# Patient Record
Sex: MALE | Race: WHITE | URBAN - METROPOLITAN AREA
[De-identification: names, ages, dates, MRNs, and addresses within clinical notes are randomized per-mention and may not be internally consistent; named-entity substitution may affect disease eponyms.]

---

## 2017-01-26 ENCOUNTER — OFFICE VISIT (OUTPATIENT)
Dept: FAMILY MEDICINE | Facility: CLINIC | Age: 42
End: 2017-01-26
Payer: COMMERCIAL

## 2017-01-26 VITALS
OXYGEN SATURATION: 99 % | HEIGHT: 72 IN | DIASTOLIC BLOOD PRESSURE: 79 MMHG | SYSTOLIC BLOOD PRESSURE: 114 MMHG | HEART RATE: 79 BPM | WEIGHT: 203 LBS | RESPIRATION RATE: 18 BRPM | BODY MASS INDEX: 27.5 KG/M2 | TEMPERATURE: 98 F

## 2017-01-26 DIAGNOSIS — R21 RASH: Primary | ICD-10-CM

## 2017-01-26 DIAGNOSIS — Z00.00 ROUTINE GENERAL MEDICAL EXAMINATION AT A HEALTH CARE FACILITY: ICD-10-CM

## 2017-01-26 DIAGNOSIS — Z23 NEED FOR PROPHYLACTIC VACCINATION WITH TETANUS-DIPHTHERIA (TD): ICD-10-CM

## 2017-01-26 LAB
ERYTHROCYTE [DISTWIDTH] IN BLOOD BY AUTOMATED COUNT: 12.9 % (ref 10–15)
HCT VFR BLD AUTO: 46.1 % (ref 40–53)
HGB BLD-MCNC: 15.7 G/DL (ref 13.3–17.7)
MCH RBC QN AUTO: 30.5 PG (ref 26.5–33)
MCHC RBC AUTO-ENTMCNC: 34.1 G/DL (ref 31.5–36.5)
MCV RBC AUTO: 90 FL (ref 78–100)
PLATELET # BLD AUTO: 323 10E9/L (ref 150–450)
RBC # BLD AUTO: 5.15 10E12/L (ref 4.4–5.9)
WBC # BLD AUTO: 7.6 10E9/L (ref 4–11)

## 2017-01-26 PROCEDURE — 99396 PREV VISIT EST AGE 40-64: CPT | Mod: 25 | Performed by: INTERNAL MEDICINE

## 2017-01-26 PROCEDURE — 90471 IMMUNIZATION ADMIN: CPT | Performed by: INTERNAL MEDICINE

## 2017-01-26 PROCEDURE — 80061 LIPID PANEL: CPT | Performed by: INTERNAL MEDICINE

## 2017-01-26 PROCEDURE — 80053 COMPREHEN METABOLIC PANEL: CPT | Performed by: INTERNAL MEDICINE

## 2017-01-26 PROCEDURE — 36415 COLL VENOUS BLD VENIPUNCTURE: CPT | Performed by: INTERNAL MEDICINE

## 2017-01-26 PROCEDURE — 85027 COMPLETE CBC AUTOMATED: CPT | Performed by: INTERNAL MEDICINE

## 2017-01-26 PROCEDURE — 90714 TD VACC NO PRESV 7 YRS+ IM: CPT | Performed by: INTERNAL MEDICINE

## 2017-01-26 NOTE — Clinical Note
"[facility]  [address] [facility]  [address]  [place]  [address]  Tel: [phone]    January 30, 2017    Mani Tillman  [facility] [place] [address]        Dear Mr. Tillman,    -Liver and gallbladder tests are normal for you. (ALT,AST, Alk phos, bilirubin), kidney function is normal for you (Creatinine, GFR), Sodium is normal, Potassium is normal for you, Calcium is normal for you.    -Your non fasting Glucose (blood sugar) is elevated, but not quite in the diabetic range.    -Your total cholesterol is 210 which is above your goal of total cholesterol less than 200.    -Your triglycerides are 173 which are just above your goal of triglycerides less than 150.    -Your HDL or \"good cholesterol\" is 34 which is below your goal of HDL cholesterol greater than 40.    -Your LDL cholesterol or \"bad cholesterol\" is  which is just above your goal of LDL cholesterol less than <160.  Your LDL goal is based on your risk factors for artery disease.    -Your complete blood counts including your hemoglobin returned normal for you.         Bottom line:  Your triglycerides and sugar are a little high and this is because you were not fasting.  You do NOT need medications to address these problems.  If you are curious, you can return for fasting labs to see if these values improve.  Otherwise, they could be rechecked in one years' time when you return for a physical.    Follow up:  Lab appointment fasting at your convenience or schedule an appointment for a physical examination with fasting blood tests in one year's time, or return sooner if new questions, symptoms or problems arise.       Sincerely,    Dr. Nunez    Results for orders placed or performed in visit on 01/26/17   Comprehensive metabolic panel   Result Value Ref Range    Sodium 140 133 - 144 mmol/L    Potassium 4.0 3.4 - 5.3 mmol/L    Chloride 104 94 - 109 mmol/L    Carbon Dioxide 29 20 - 32 mmol/L    Anion Gap 7 3 - 14 mmol/L "    Glucose 125 (H) 70 - 99 mg/dL    Urea Nitrogen 14 7 - 30 mg/dL    Creatinine 0.84 0.66 - 1.25 mg/dL    GFR Estimate >90  Non  GFR Calc   >60 mL/min/1.7m2    GFR Estimate If Black >90   GFR Calc   >60 mL/min/1.7m2    Calcium 9.0 8.5 - 10.1 mg/dL    Bilirubin Total 0.6 0.2 - 1.3 mg/dL    Albumin 4.2 3.4 - 5.0 g/dL    Protein Total 7.7 6.8 - 8.8 g/dL    Alkaline Phosphatase 94 40 - 150 U/L    ALT 64 0 - 70 U/L    AST 24 0 - 45 U/L   Lipid Profile with reflex to direct LDL   Result Value Ref Range    Cholesterol 210 (H) <200 mg/dL    Triglycerides 173 (H) <150 mg/dL    HDL Cholesterol 34 (L) >39 mg/dL    LDL Cholesterol Calculated 141 (H) <100 mg/dL    Non HDL Cholesterol 176 (H) <130 mg/dL   CBC with platelets   Result Value Ref Range    WBC 7.6 4.0 - 11.0 10e9/L    RBC Count 5.15 4.4 - 5.9 10e12/L    Hemoglobin 15.7 13.3 - 17.7 g/dL    Hematocrit 46.1 40.0 - 53.0 %    MCV 90 78 - 100 fl    MCH 30.5 26.5 - 33.0 pg    MCHC 34.1 31.5 - 36.5 g/dL    RDW 12.9 10.0 - 15.0 %    Platelet Count 323 150 - 450 10e9/L

## 2017-01-26 NOTE — PROGRESS NOTES
SUBJECTIVE:     CC: Mani Tillman is an 41 year old male who presents for preventative health visit.     Healthy Habits:    Do you get at least three servings of calcium containing foods daily (dairy, green leafy vegetables, etc.)? yes    Amount of exercise or daily activities, outside of work: light walking    Problems taking medications regularly No    Medication side effects: No    Have you had an eye exam in the past two years? yes    Do you see a dentist twice per year? no    Do you have sleep apnea, excessive snoring or daytime drowsiness?no            Today's PHQ-2 Score:   PHQ-2 ( 1999 Pf[employer]) 1/26/2017 11/21/2014   Q1: Little interest or pleasure in doing things 0 0   Q2: Feeling down, depressed or hopeless 0 0   PHQ-2 Score 0 0       Abuse: Current or Past(Physical, Sexual or Emotional)- No  Do you feel safe in your environment - Yes    Social History   Substance Use Topics     Smoking status: Never Smoker      Smokeless tobacco: Never Used     Alcohol Use: Yes      Comment: 1 drink per 2 weeks     The patient does not drink >3 drinks per day nor >7 drinks per week.    Last PSA: No results found for: PSA    Recent Labs   Lab Test  11/21/14   1013  04/26/13   1013   CHOL  209*  190   HDL  36*  35*   LDL  147*  136*   TRIG  132  89   CHOLHDLRATIO  5.8*  5.3*       Reviewed orders with patient. Reviewed health maintenance and updated orders accordingly -     All Histories reviewed and updated in Epic.      ROS:  C: NEGATIVE for fever, chills, change in weight  I: several year long history of red rash on back, neck, chest not itchy  E: NEGATIVE for vision changes or irritation  ENT: NEGATIVE for ear, mouth and throat problems  R: NEGATIVE for significant cough or SOB  CV: NEGATIVE for chest pain, palpitations or peripheral edema  GI: NEGATIVE for nausea, abdominal pain, heartburn, or change in bowel habits   male: negative for dysuria, hematuria, decreased urinary stream, erectile dysfunction, urethral  discharge  M: NEGATIVE for significant arthralgias or myalgia  N: NEGATIVE for weakness, dizziness or paresthesias  P: NEGATIVE for changes in mood or affect    Patient Active Problem List   Diagnosis     Hyperlipidemia LDL goal <160     Palpitations     Past Surgical History   Procedure Laterality Date     C revise thumb tendon  1996     Hc tooth extraction w/forcep  2010       Social History   Substance Use Topics     Smoking status: Never Smoker      Smokeless tobacco: Never Used     Alcohol Use: Yes      Comment: 1 drink per 2 weeks     Family History   Problem Relation Age of Onset     Cardiovascular Maternal Grandfather      C.A.D. Maternal Grandfather      CEREBROVASCULAR DISEASE Paternal Grandfather      Lipids No family hx of      Asthma No family hx of      DIABETES No family hx of      Cancer - colorectal No family hx of      Prostate Cancer No family hx of      Anesthesia Reaction No family hx of      Blood Disease No family hx of      Eye Disorder No family hx of      Thyroid Disease No family hx of      Family History Negative Mother      Arrhythmia Father          No current outpatient prescriptions on file.     No Known Allergies  OBJECTIVE:     /79 mmHg  Pulse 79  Temp(Src) 98  F (36.7  C) (Oral)  Resp 18  Ht 6' (1.829 m)  Wt 203 lb (92.08 kg)  BMI 27.53 kg/m2  SpO2 99%  EXAM:  GENERAL: healthy, alert and no distress  EYES: Eyes grossly normal to inspection, PERRL and conjunctivae and sclerae normal  HENT: ear canals and TM's normal, nose and mouth without ulcers or lesions  NECK: no adenopathy, no asymmetry, masses, or scars and thyroid normal to palpation  RESP: lungs clear to auscultation - no rales, rhonchi or wheezes  CV: regular rate and rhythm, normal S1 S2, no S3 or S4, no murmur, click or rub, no peripheral edema and peripheral pulses strong  ABDOMEN: soft, nontender, no hepatosplenomegaly, no masses and bowel sounds normal  RECTAL: normal sphincter tone, no rectal masses,  prostate normal size, smooth, nontender without nodules or masses  MS: no gross musculoskeletal defects noted, no edema  SKIN: diffuse erythematous macular/papular rash on chest , neck and back  NEURO: Normal strength and tone, mentation intact and speech normal  PSYCH: mentation appears normal, affect normal/bright    ASSESSMENT/PLAN:     1. Routine general medical examination at a health care facility    - Comprehensive metabolic panel  - Lipid Profile with reflex to direct LDL  - CBC with platelets    2. Need for prophylactic vaccination with tetanus-diphtheria (TD)  Td today    See Dr. Ramon about chronic rash    COUNSELING:  Reviewed preventive health counseling, as reflected in patient instructions       Regular exercise       Healthy diet/nutrition         reports that he has never smoked. He has never used smokeless tobacco.    Estimated body mass index is 27.53 kg/(m^2) as calculated from the following:    Height as of this encounter: 6' (1.829 m).    Weight as of this encounter: 203 lb (92.08 kg).   Weight management plan: Discussed healthy diet and exercise guidelines and patient will follow up in 12 months in clinic to re-evaluate.    Counseling Resources:  ATP IV Guidelines  Pooled Cohorts Equation Calculator  FRAX Risk Assessment  ICSI Preventive Guidelines  Dietary Guidelines for Americans, 2010  USDA's MyPlate  ASA Prophylaxis  Lung CA Screening    Jonathan Nunez MD  [facility]

## 2017-01-26 NOTE — NURSING NOTE
Screening Questionnaire for Adult Immunization    Are you sick today?   No   Do you have allergies to medications, food, a vaccine component or latex?   No   Have you ever had a serious reaction after receiving a vaccination?   No   Do you have a long-term health problem with heart disease, lung disease, asthma, kidney disease, metabolic disease (e.g. diabetes), anemia, or other blood disorder?   No   Do you have cancer, leukemia, HIV/AIDS, or any other immune system problem?   No   In the past 3 months, have you taken medications that affect  your immune system, such as prednisone, other steroids, or anticancer drugs; drugs for the treatment of rheumatoid arthritis, Crohn s disease, or psoriasis; or have you had radiation treatments?   No   Have you had a seizure, or a brain or other nervous system problem?   No   During the past year, have you received a transfusion of blood or blood     products, or been given immune (gamma) globulin or antiviral drug?   No   For women: Are you pregnant or is there a chance you could become        pregnant during the next month?   No   Have you received any vaccinations in the past 4 weeks?   No     Immunization questionnaire answers were all negative.      MNVFC doesn't apply on this patient    Per orders of Dr. Nunez, injection of TD given by Hudson Funes. Patient instructed to remain in clinic for 20 minutes afterwards, and to report any adverse reaction to me immediately.       Screening performed by Hudson Funes on 1/26/2017 at 5:58 PM.

## 2017-01-26 NOTE — MR AVS SNAPSHOT
After Visit Summary   1/26/2017    Mani Tillman    MRN: [id number]           Patient Information     Date Of Birth          1975        Visit Information        Provider Department      1/26/2017 4:00 PM Jonathan Nunez MD [facility]        Today's Diagnoses     Rash    -  1     Routine general medical examination at a health care facility         Need for prophylactic vaccination with tetanus-diphtheria (TD)           Care Instructions      Preventive Health Recommendations  Male Ages 40 to 49    Yearly exam:             See your health care provider every year in order to  o   Review health changes.   o   Discuss preventive care.    o   Review your medicines if your doctor has prescribed any.    You should be tested each year for STDs (sexually transmitted diseases) if you re at risk.     Have a cholesterol test every 5 years.     Have a colonoscopy (test for colon cancer) if someone in your family has had colon cancer or polyps before age 50.     After age 45, have a diabetes test (fasting glucose). If you are at risk for diabetes, you should have this test every 3 years.      Talk with your health care provider about whether or not a prostate cancer screening test (PSA) is right for you.    Shots: Get a flu shot each year. Get a tetanus shot every 10 years.     Nutrition:    Eat at least 5 servings of fruits and vegetables daily.     Eat whole-grain bread, whole-wheat pasta and brown rice instead of white grains and rice.     Talk to your provider about Calcium and Vitamin D.     Lifestyle    Exercise for at least 150 minutes a week (30 minutes a day, 5 days a week). This will help you control your weight and prevent disease.     Limit alcohol to one drink per day.     No smoking.     Wear sunscreen to prevent skin cancer.     See your dentist every six months for an exam and cleaning.            Follow-ups after your visit        Additional Services     DERMATOLOGY REFERRAL        Your provider has referred you to: [facility]: [facility]  Stephanie [phone]   [email or web address]    Dr. Chrissy Ramon    Please be aware that coverage of these services is subject to the terms and limitations of your health insurance plan.  Call member services at your health plan with any benefit or coverage questions.      Please bring the following with you to your appointment:    (1) Any X-Rays, CTs or MRIs which have been performed.  Contact the facility where they were done to arrange for  prior to your scheduled appointment.    (2) List of current medications  (3) This referral request   (4) Any documents/labs given to you for this referral                  Who to contact     If you have questions or need follow up information about today's clinic visit or your schedule please contact [facility] directly at [phone].  Normal or non-critical lab and imaging results will be communicated to you by MyChart, letter or phone within 4 business days after the clinic has received the results. If you do not hear from us within 7 days, please contact the clinic through MyChart or phone. If you have a critical or abnormal lab result, we will notify you by phone as soon as possible.  Submit refill requests through [employer] or call your pharmacy and they will forward the refill request to us. Please allow 3 business days for your refill to be completed.          Additional Information About Your Visit        MyChart Information     [employer] gives you secure access to your electronic health record. If you see a primary care provider, you can also send messages to your care team and make appointments. If you have questions, please call your primary care clinic.  If you do not have a primary care provider, please call [phone] and they will assist you.        Care EveryWhere ID     This is your Care EveryWhere ID. This could be used by other organizations to access your [facility]  medical records  [id number]        Your Vitals Were     Pulse Temperature Respirations Height BMI (Body Mass Index) Pulse Oximetry    79 98  F (36.7  C) (Oral) 18 6' (1.829 m) 27.53 kg/m2 99%       Blood Pressure from Last 3 Encounters:   01/26/17 114/79   11/21/14 116/72   04/26/13 113/73    Weight from Last 3 Encounters:   01/26/17 203 lb (92.08 kg)   11/21/14 192 lb 12.8 oz (87.454 kg)   04/26/13 186 lb (84.369 kg)              We Performed the Following     CBC with platelets     Comprehensive metabolic panel     DERMATOLOGY REFERRAL     Lipid Profile with reflex to direct LDL          Today's Medication Changes          These changes are accurate as of: 1/26/17  4:39 PM.  If you have any questions, ask your nurse or doctor.               Stop taking these medicines if you haven't already. Please contact your care team if you have questions.     MULTIVITAMINS PO   Stopped by:  Jonathan Nunez MD                    Primary Care Provider Office Phone # Fax #    Jonathan Nunez [phone]       [facility] [address] [facility] [address]        Thank you!     Thank you for choosing [facility]  for your care. Our goal is always to provide you with excellent care. Hearing back from our patients is one way we can continue to improve our services. Please take a few minutes to complete the written survey that you may receive in the mail after your visit with us. Thank you!             Your Updated Medication List - Protect others around you: Learn how to safely use, store and throw away your medicines at www.disposemymeds.org.      Notice  As of 1/26/2017  4:39 PM    You have not been prescribed any medications.

## 2017-01-27 LAB
ALBUMIN SERPL-MCNC: 4.2 G/DL (ref 3.4–5)
ALP SERPL-CCNC: 94 U/L (ref 40–150)
ALT SERPL W P-5'-P-CCNC: 64 U/L (ref 0–70)
ANION GAP SERPL CALCULATED.3IONS-SCNC: 7 MMOL/L (ref 3–14)
AST SERPL W P-5'-P-CCNC: 24 U/L (ref 0–45)
BILIRUB SERPL-MCNC: 0.6 MG/DL (ref 0.2–1.3)
BUN SERPL-MCNC: 14 MG/DL (ref 7–30)
CALCIUM SERPL-MCNC: 9 MG/DL (ref 8.5–10.1)
CHLORIDE SERPL-SCNC: 104 MMOL/L (ref 94–109)
CHOLEST SERPL-MCNC: 210 MG/DL
CO2 SERPL-SCNC: 29 MMOL/L (ref 20–32)
CREAT SERPL-MCNC: 0.84 MG/DL (ref 0.66–1.25)
GFR SERPL CREATININE-BSD FRML MDRD: ABNORMAL ML/MIN/1.7M2
GLUCOSE SERPL-MCNC: 125 MG/DL (ref 70–99)
HDLC SERPL-MCNC: 34 MG/DL
LDLC SERPL CALC-MCNC: 141 MG/DL
NONHDLC SERPL-MCNC: 176 MG/DL
POTASSIUM SERPL-SCNC: 4 MMOL/L (ref 3.4–5.3)
PROT SERPL-MCNC: 7.7 G/DL (ref 6.8–8.8)
SODIUM SERPL-SCNC: 140 MMOL/L (ref 133–144)
TRIGL SERPL-MCNC: 173 MG/DL

## 2017-01-28 NOTE — PROGRESS NOTES
"Quick Note:    The following letter pertains to your most recent diagnostic tests:    -Liver and gallbladder tests are normal for you. (ALT,AST, Alk phos, bilirubin), kidney function is normal for you (Creatinine, GFR), Sodium is normal, Potassium is normal for you, Calcium is normal for you.    -Your non fasting Glucose (blood sugar) is elevated, but not quite in the diabetic range.    -Your total cholesterol is 210 which is above your goal of total cholesterol less than 200.    -Your triglycerides are 173 which are just above your goal of triglycerides less than 150.    -Your HDL or \"good cholesterol\" is 34 which is below your goal of HDL cholesterol greater than 40.    -Your LDL cholesterol or \"bad cholesterol\" is which is just above your goal of LDL cholesterol less than <160. Your LDL goal is based on your risk factors for artery disease.    -Your complete blood counts including your hemoglobin returned normal for you.               Bottom line: Your triglycerides and sugar are a little high and this is because you were not fasting. You do NOT need medications to address these problems. If you are curious, you can return for fasting labs to see if these values improve. Otherwise, they could be rechecked in one years' time when you return for a physical.      Follow up: Lab appointment fasting at your convenience or schedule an appointment for a physical examination with fasting blood tests in one year's time, or return sooner if new questions, symptoms or problems arise.        Sincerely,    Dr. Nunez              The 10-year ASCVD risk score (Rose FLOYD Jr., et al., 2013) is: 1.8%   Values used to calculate the score:   Age: 41 years   Sex: Male   Is an : No   Diabetic: No   Tobacco smoker: No   Systolic Blood Pressure: 114 mmHg   Prescribed Antihypertensives: No   HDL Cholesterol: 34 mg/dL   Total Cholesterol: 210 mg/dL  ______  "

## 2020-02-16 ENCOUNTER — HEALTH MAINTENANCE LETTER (OUTPATIENT)
Age: 45
End: 2020-02-16

## 2020-11-29 ENCOUNTER — HEALTH MAINTENANCE LETTER (OUTPATIENT)
Age: 45
End: 2020-11-29

## 2021-04-10 ENCOUNTER — HEALTH MAINTENANCE LETTER (OUTPATIENT)
Age: 46
End: 2021-04-10

## 2021-09-19 ENCOUNTER — HEALTH MAINTENANCE LETTER (OUTPATIENT)
Age: 46
End: 2021-09-19

## 2022-05-01 ENCOUNTER — HEALTH MAINTENANCE LETTER (OUTPATIENT)
Age: 47
End: 2022-05-01

## 2022-11-21 ENCOUNTER — HEALTH MAINTENANCE LETTER (OUTPATIENT)
Age: 47
End: 2022-11-21

## 2023-06-02 ENCOUNTER — HEALTH MAINTENANCE LETTER (OUTPATIENT)
Age: 48
End: 2023-06-02